# Patient Record
Sex: FEMALE | Race: WHITE
[De-identification: names, ages, dates, MRNs, and addresses within clinical notes are randomized per-mention and may not be internally consistent; named-entity substitution may affect disease eponyms.]

---

## 2017-03-23 ENCOUNTER — HOSPITAL ENCOUNTER (OUTPATIENT)
Dept: HOSPITAL 45 - C.MAMM | Age: 67
Discharge: HOME | End: 2017-03-23
Attending: OBSTETRICS & GYNECOLOGY
Payer: COMMERCIAL

## 2017-03-23 DIAGNOSIS — Z12.31: Primary | ICD-10-CM

## 2017-03-23 NOTE — MAMMOGRAPHY REPORT
BILATERAL DIGITAL SCREENING MAMMOGRAM WITH CAD: 3/23/2017

CLINICAL HISTORY: Routine screening.  Patient has no complaints.  





TECHNIQUE:  Current study was also evaluated with a Computer Aided Detection (CAD) system.  Bilatera
l CC and MLO views were obtained.



COMPARISON: Comparison is made to exams dated:  3/17/2016 mammogram, 3/16/2015 mammogram, 3/13/2014 
mammogram, 3/11/2013 mammogram, 3/5/2012 mammogram, and 3/3/2011 mammogram - [facility]
enter.   



BREAST COMPOSITION:  The tissue of both breasts is heterogeneously dense, which may obscure small ma
sses.  



FINDINGS:  No suspicious masses, calcifications, or areas of architectural distortion are noted in e
ither breast. There has been no significant interval change compared to prior exams.  Bilateral titus
gn-appearing calcifications are not significantly changed, including grouped calcifications in the l
eft lower inner quadrant which are stable dating back to at least the 2015 exam.





IMPRESSION:  ACR BI-RADS CATEGORY 2: BENIGN

There is no mammographic evidence of malignancy. A 1 year screening mammogram is recommended.  The p
atient will receive written notification of the results.  





Approximately 10% of breast cancers are not detected with mammography. A negative mammographic repor
t should not delay biopsy if a clinically suggestive mass is present.



Shauna Garner M.D.          

/:3/23/2017 12:16:40  



Imaging Technologist: Jyotsna Crawford [facility]

letter sent: Normal 1/2  

BI-RADS Code: ACR BI-RADS Category 2: Benign

## 2017-05-11 ENCOUNTER — HOSPITAL ENCOUNTER (OUTPATIENT)
Dept: HOSPITAL 45 - C.MAMM | Age: 67
Discharge: HOME | End: 2017-05-11
Attending: INTERNAL MEDICINE
Payer: COMMERCIAL

## 2017-05-11 DIAGNOSIS — M85.851: Primary | ICD-10-CM

## 2017-05-11 DIAGNOSIS — M85.88: ICD-10-CM

## 2017-05-11 DIAGNOSIS — M85.852: ICD-10-CM

## 2018-03-26 ENCOUNTER — HOSPITAL ENCOUNTER (OUTPATIENT)
Dept: HOSPITAL 45 - C.MAMM | Age: 68
Discharge: HOME | End: 2018-03-26
Attending: OBSTETRICS & GYNECOLOGY
Payer: COMMERCIAL

## 2018-03-26 DIAGNOSIS — Z12.31: Primary | ICD-10-CM

## 2018-03-27 NOTE — MAMMOGRAPHY REPORT
BILATERAL DIGITAL SCREENING MAMMOGRAM TOMOSYNTHESIS WITH CAD: 3/26/2018

CLINICAL HISTORY: Routine screening examination.  





TECHNIQUE:  Breast tomosynthesis in addition to standard 2D mammography was performed. Current study 
was also evaluated with a Computer Aided Detection (CAD) system.  



COMPARISON: Comparison is made to exams dated:  3/23/2017 mammogram, 3/17/2016 mammogram, 3/16/2015 m
ammogram, 3/13/2014 mammogram, 3/11/2013 mammogram, and 3/5/2012 mammogram - [facility]
nter.   



BREAST COMPOSITION:  The tissue of both breasts is heterogeneously dense, which may obscure small mas
ses.  



FINDINGS:  There is a possible area of architectural distortion in the upper outer posterior right br
east, that could represent normal overlapping tissue although additional spot compression tomosynthes
is views and possible ultrasound are recommended.



No other suspicious mass, architectural distortion or cluster of microcalcifications is seen.  



IMPRESSION:  ACR BI-RADS CATEGORY 0: INCOMPLETE EVALUATION:  NEED ADDITIONAL IMAGING EVALUATION

The possible area of architectural distortion in the upper outer posterior right breast needs additio
nal evaluation.  

The patient will be called to schedule an appointment.  





Approximately 10% of breast cancers are not detected with mammography. A negative mammographic report
 should not delay biopsy if a clinically suggestive mass is present.



Lori Pinto M.D.          

ay/:3/26/2018 14:49:47  



Imaging Technologist: Divine PEACOCK)([place]), [facility]

letter sent: Addl Imaging 0  

BI-RADS Code: ACR BI-RADS Category 0: Incomplete Evaluation:  Need Additional Imaging Evaluation

## 2018-04-02 ENCOUNTER — HOSPITAL ENCOUNTER (OUTPATIENT)
Dept: HOSPITAL 45 - C.MAMM | Age: 68
Discharge: HOME | End: 2018-04-02
Attending: OBSTETRICS & GYNECOLOGY
Payer: COMMERCIAL

## 2018-04-02 DIAGNOSIS — R92.8: Primary | ICD-10-CM

## 2018-04-03 NOTE — MAMMOGRAPHY REPORT
UNILATERAL RIGHT DIGITAL DIAGNOSTIC MAMMOGRAM TOMOSYNTHESIS AND TARGETED RIGHT ULTRASOUND: 4/2/2018

CLINICAL HISTORY: 67-year-old woman called back from screening mammography for a possible area of arc
hitectural distortion in the superior posterior right breast, best seen on the MLO view, thought to p
roject laterally on the cc view.  Family history of breast cancer = niece.  Patient has a personal hi
story of GYN cancer.  





TECHNIQUE: Spot compression tomosynthesis right CC, MLO and exaggerated lateral CC views were obtaine
d.



COMPARISON: Comparison is made to exams dated:  3/26/2018 mammogram, 3/23/2017 mammogram, 3/17/2016 m
ammogram, 3/16/2015 mammogram, 3/13/2014 mammogram, and 3/5/2012 mammogram - [facility]
nter.   



BREAST COMPOSITION:  The tissue of the right breast is heterogeneously dense, which may obscure small
 masses.  



FINDINGS: There is no definite persistent architectural distortion on any of the supplemental spot co
mpression tomosynthesis views of the right breast.  No obvious mass, asymmetry or suspicious calcific
ations.  Further evaluation with ultrasound was performed.



Targeted ultrasound was performed throughout the superior and lateral right breast.  Sonographically 
normal tissue and a few areas of benign-appearing duct ectasia are identified, without evidence of a 
suspicious solid or cystic mass.  No architectural distortion is appreciated in real-time ultrasound 
scanning. 



IMPRESSION:  ACR-BI-RADS CATEGORY 3: PROBABLY BENIGN, TARGETED ULTRASOUND ACR-BI-RADS CATEGORY 3: PRO
BABLY BENIGN 

There is no definite persistent mammographic distortion on the supplemental spot compression tomosynt
hesis images, and no suspicious sonographic correlate identified.  Given the conspicuous nature of th
e possible distortion on the initial screening mammogram, and dense breasts, a short interval follow-
up right diagnostic tomosynthesis mammogram and possible ultrasound is recommended to ensure stabilit
y in 6 months.



These results and recommendations were discussed with the patient at the time of the exam.



Approximately 10% of breast cancers are not detected with mammography. A negative mammographic report
 should not delay biopsy if a clinically suggestive mass is present.



Lori Pinto M.D.          

ay/:4/2/2018 14:20:57  



Imaging Technologist: Jyotsna HSAH(R)([place]), [facility]

letter sent: Follow Up Recommended 3  

BI-RADS Code: ACR-BI-RADS Category 3: Probably Benign  Ultrasound BI-RADS: ACR-BI-RADS Category 3: Pr
obably Benign

## 2018-04-17 ENCOUNTER — HOSPITAL ENCOUNTER (OUTPATIENT)
Dept: HOSPITAL 45 - C.LABBC | Age: 68
Discharge: HOME | End: 2018-04-17
Attending: INTERNAL MEDICINE
Payer: COMMERCIAL

## 2018-04-17 DIAGNOSIS — M85.80: ICD-10-CM

## 2018-04-17 DIAGNOSIS — E78.5: Primary | ICD-10-CM

## 2018-04-17 DIAGNOSIS — M19.90: ICD-10-CM

## 2018-04-17 DIAGNOSIS — H81.09: ICD-10-CM

## 2018-04-17 LAB
BASOPHILS # BLD: 0.04 K/UL (ref 0–0.2)
BASOPHILS NFR BLD: 0.8 %
BUN SERPL-MCNC: 30 MG/DL (ref 7–18)
CALCIUM SERPL-MCNC: 9.5 MG/DL (ref 8.5–10.1)
CO2 SERPL-SCNC: 30 MMOL/L (ref 21–32)
CREAT SERPL-MCNC: 1.28 MG/DL (ref 0.6–1.2)
EOS ABS #: 0.22 K/UL (ref 0–0.5)
EOSINOPHIL NFR BLD AUTO: 184 K/UL (ref 130–400)
GLUCOSE SERPL-MCNC: 86 MG/DL (ref 70–99)
HCT VFR BLD CALC: 43.5 % (ref 37–47)
HGB BLD-MCNC: 14.5 G/DL (ref 12–16)
IG#: 0.01 K/UL (ref 0–0.02)
IMM GRANULOCYTES NFR BLD AUTO: 28.6 %
KETONES UR QL STRIP: 104 MG/DL
LYMPHOCYTES # BLD: 1.36 K/UL (ref 1.2–3.4)
MCH RBC QN AUTO: 30.9 PG (ref 25–34)
MCHC RBC AUTO-ENTMCNC: 33.3 G/DL (ref 32–36)
MCV RBC AUTO: 92.8 FL (ref 80–100)
MONO ABS #: 0.59 K/UL (ref 0.11–0.59)
MONOCYTES NFR BLD: 12.4 %
NEUT ABS #: 2.54 K/UL (ref 1.4–6.5)
NEUTROPHILS # BLD AUTO: 4.6 %
NEUTROPHILS NFR BLD AUTO: 53.4 %
PH UR: 213 MG/DL (ref 0–200)
PMV BLD AUTO: 11.4 FL (ref 7.4–10.4)
POTASSIUM SERPL-SCNC: 4.1 MMOL/L (ref 3.5–5.1)
RED CELL DISTRIBUTION WIDTH CV: 12.4 % (ref 11.5–14.5)
RED CELL DISTRIBUTION WIDTH SD: 41.7 FL (ref 36.4–46.3)
SODIUM SERPL-SCNC: 138 MMOL/L (ref 136–145)
WBC # BLD AUTO: 4.76 K/UL (ref 4.8–10.8)

## 2018-04-23 ENCOUNTER — HOSPITAL ENCOUNTER (OUTPATIENT)
Dept: HOSPITAL 45 - C.ULTR | Age: 68
Discharge: HOME | End: 2018-04-23
Attending: INTERNAL MEDICINE
Payer: COMMERCIAL

## 2018-04-23 DIAGNOSIS — R79.89: Primary | ICD-10-CM

## 2018-04-23 NOTE — DIAGNOSTIC IMAGING REPORT
EXAMINATION: RENAL ULTRASOUND



CLINICAL HISTORY: R79.89 Elevated serum creatinineULTR    



COMPARISON STUDY:  CT scan dated 4/12/2013



FINDINGS: The right kidney measures 9.5 cm. The left kidney measures 9.5 cm. 

There is no evidence of hydronephrosis.  There are no renal masses. There is

mild renal cortical thinning.



No bladder abnormalities are visualized.  Bilateral ureteral jets were

visualized.

                 

IMPRESSION : 

1. Mild renal cortical thinning

2. No renal masses identified

3. No evidence of hydronephrosis







Electronically signed by:  Mono Ceolho M.D.

4/23/2018 1:12 PM



Dictated Date/Time:  4/23/2018 1:11 PM

## 2018-08-13 ENCOUNTER — HOSPITAL ENCOUNTER (OUTPATIENT)
Dept: HOSPITAL 45 - C.LABBC | Age: 68
Discharge: HOME | End: 2018-08-13
Attending: INTERNAL MEDICINE
Payer: COMMERCIAL

## 2018-08-13 DIAGNOSIS — N18.3: Primary | ICD-10-CM

## 2018-08-13 LAB
ALBUMIN SERPL-MCNC: 3.7 GM/DL (ref 3.4–5)
BUN SERPL-MCNC: 32 MG/DL (ref 7–18)
CALCIUM SERPL-MCNC: 9 MG/DL (ref 8.5–10.1)
CO2 SERPL-SCNC: 34 MMOL/L (ref 21–32)
CREAT SERPL-MCNC: 1.1 MG/DL (ref 0.6–1.2)
GLUCOSE SERPL-MCNC: 86 MG/DL (ref 70–99)
PHOSPHATE SERPL-MCNC: 3.7 MG/DL (ref 2.5–4.9)
POTASSIUM SERPL-SCNC: 3.6 MMOL/L (ref 3.5–5.1)
SODIUM SERPL-SCNC: 140 MMOL/L (ref 136–145)